# Patient Record
Sex: MALE | Race: WHITE | Employment: FULL TIME | URBAN - METROPOLITAN AREA
[De-identification: names, ages, dates, MRNs, and addresses within clinical notes are randomized per-mention and may not be internally consistent; named-entity substitution may affect disease eponyms.]

---

## 2018-05-21 ENCOUNTER — HOSPITAL ENCOUNTER (EMERGENCY)
Facility: HOSPITAL | Age: 28
Discharge: HOME | End: 2018-05-22
Attending: EMERGENCY MEDICINE
Payer: COMMERCIAL

## 2018-05-21 ENCOUNTER — APPOINTMENT (OUTPATIENT)
Dept: ULTRASOUND IMAGING | Facility: HOSPITAL | Age: 28
End: 2018-05-21
Attending: EMERGENCY MEDICINE
Payer: COMMERCIAL

## 2018-05-21 DIAGNOSIS — R10.32 LEFT GROIN PAIN: Primary | ICD-10-CM

## 2018-05-21 DIAGNOSIS — N50.812 LEFT TESTICULAR PAIN: ICD-10-CM

## 2018-05-21 PROCEDURE — 81003 URINALYSIS AUTO W/O SCOPE: CPT | Performed by: EMERGENCY MEDICINE

## 2018-05-21 PROCEDURE — 76870 US EXAM SCROTUM: CPT | Performed by: EMERGENCY MEDICINE

## 2018-05-21 PROCEDURE — 87591 N.GONORRHOEAE DNA AMP PROB: CPT | Performed by: EMERGENCY MEDICINE

## 2018-05-21 PROCEDURE — 87491 CHLMYD TRACH DNA AMP PROBE: CPT | Performed by: EMERGENCY MEDICINE

## 2018-05-21 PROCEDURE — 93975 VASCULAR STUDY: CPT | Performed by: EMERGENCY MEDICINE

## 2018-05-21 PROCEDURE — 99284 EMERGENCY DEPT VISIT MOD MDM: CPT

## 2018-05-22 VITALS
WEIGHT: 150 LBS | SYSTOLIC BLOOD PRESSURE: 124 MMHG | HEIGHT: 69 IN | RESPIRATION RATE: 16 BRPM | HEART RATE: 70 BPM | DIASTOLIC BLOOD PRESSURE: 80 MMHG | TEMPERATURE: 99 F | BODY MASS INDEX: 22.22 KG/M2 | OXYGEN SATURATION: 99 %

## 2018-05-22 NOTE — ED PROVIDER NOTES
Patient Seen in: [facility] AND [facility] Emergency Department    History   Patient presents with:  Abdomen/Flank Pain (GI/)      HPI    Patient presents to the ED complaining of cloudy urine, frequent urination, pain with urination and pain in his left testi normal. No stridor. No respiratory distress. Abdominal: Soft. Bowel sounds are normal. He exhibits no distension and no mass. There is no tenderness. There is no rebound and no guarding.    Genitourinary:   Genitourinary Comments: no urethral tenderness, 122/66 126/74 124/80   Pulse: 78 74 70   Resp: 18 16 16   Temp: 98.5 °F (36.9 °C)     SpO2: 99% 98% 99%   Weight: 68 kg     Height: 175.3 cm (5' 9\")       *I personally reviewed and interpreted all ED vitals.     Pulse Ox: 99%, Room air, Normal      Differ

## 2018-05-22 NOTE — ED NOTES
Pt reports to ED with complaints of left groin pain, cloudy urine, increased urinary frequency, and pain when voiding. Pt denies fever or other symptoms.

## 2018-07-08 ENCOUNTER — HOSPITAL ENCOUNTER (EMERGENCY)
Facility: HOSPITAL | Age: 28
Discharge: HOME | End: 2018-07-08

## 2018-07-08 ENCOUNTER — APPOINTMENT (OUTPATIENT)
Dept: ULTRASOUND IMAGING | Facility: HOSPITAL | Age: 28
End: 2018-07-08
Attending: NURSE PRACTITIONER

## 2018-07-08 ENCOUNTER — APPOINTMENT (OUTPATIENT)
Dept: CT IMAGING | Facility: HOSPITAL | Age: 28
End: 2018-07-08
Attending: NURSE PRACTITIONER

## 2018-07-08 VITALS
TEMPERATURE: 98 F | HEART RATE: 77 BPM | WEIGHT: 150 LBS | SYSTOLIC BLOOD PRESSURE: 128 MMHG | DIASTOLIC BLOOD PRESSURE: 72 MMHG | RESPIRATION RATE: 16 BRPM | BODY MASS INDEX: 22.22 KG/M2 | HEIGHT: 69 IN | OXYGEN SATURATION: 97 %

## 2018-07-08 DIAGNOSIS — N20.0 KIDNEY STONE: Primary | ICD-10-CM

## 2018-07-08 LAB
ANION GAP SERPL CALC-SCNC: 13 MMOL/L (ref 0–18)
BASOPHILS # BLD: 0 K/UL (ref 0–0.2)
BASOPHILS NFR BLD: 0 %
BILIRUB UR QL: NEGATIVE
BUN SERPL-MCNC: 13 MG/DL (ref 8–20)
BUN/CREAT SERPL: 10.2 (ref 10–20)
CALCIUM SERPL-MCNC: 9.8 MG/DL (ref 8.5–10.5)
CHLORIDE SERPL-SCNC: 103 MMOL/L (ref 95–110)
CLARITY UR: CLEAR
CO2 SERPL-SCNC: 23 MMOL/L (ref 22–32)
COLOR UR: YELLOW
CREAT SERPL-MCNC: 1.28 MG/DL (ref 0.5–1.5)
EOSINOPHIL # BLD: 0 K/UL (ref 0–0.7)
EOSINOPHIL NFR BLD: 0 %
ERYTHROCYTE [DISTWIDTH] IN BLOOD BY AUTOMATED COUNT: 13.1 % (ref 11–15)
GLUCOSE SERPL-MCNC: 116 MG/DL (ref 70–99)
GLUCOSE UR-MCNC: NEGATIVE MG/DL
HCT VFR BLD AUTO: 50.3 % (ref 41–52)
HGB BLD-MCNC: 17.2 G/DL (ref 13.5–17.5)
KETONES UR-MCNC: NEGATIVE MG/DL
LEUKOCYTE ESTERASE UR QL STRIP.AUTO: NEGATIVE
LYMPHOCYTES # BLD: 1.8 K/UL (ref 1–4)
LYMPHOCYTES NFR BLD: 12 %
MCH RBC QN AUTO: 32.1 PG (ref 27–32)
MCHC RBC AUTO-ENTMCNC: 34.2 G/DL (ref 32–37)
MCV RBC AUTO: 93.7 FL (ref 80–100)
MONOCYTES # BLD: 1.1 K/UL (ref 0–1)
MONOCYTES NFR BLD: 7 %
NEUTROPHILS # BLD AUTO: 12 K/UL (ref 1.8–7.7)
NEUTROPHILS NFR BLD: 80 %
NITRITE UR QL STRIP.AUTO: NEGATIVE
OSMOLALITY UR CALC.SUM OF ELEC: 289 MOSM/KG (ref 275–295)
PH UR: 8 [PH] (ref 5–8)
PLATELET # BLD AUTO: 233 K/UL (ref 140–400)
PMV BLD AUTO: 7.7 FL (ref 7.4–10.3)
POTASSIUM SERPL-SCNC: 3.6 MMOL/L (ref 3.3–5.1)
PROT UR-MCNC: NEGATIVE MG/DL
RBC # BLD AUTO: 5.37 M/UL (ref 4.5–5.9)
RBC #/AREA URNS AUTO: 4 /HPF
SODIUM SERPL-SCNC: 139 MMOL/L (ref 136–144)
SP GR UR STRIP: 1.01 (ref 1–1.03)
UROBILINOGEN UR STRIP-ACNC: <2
VIT C UR-MCNC: NEGATIVE MG/DL
WBC # BLD AUTO: 14.9 K/UL (ref 4–11)
WBC #/AREA URNS AUTO: <1 /HPF

## 2018-07-08 PROCEDURE — 80048 BASIC METABOLIC PNL TOTAL CA: CPT | Performed by: NURSE PRACTITIONER

## 2018-07-08 PROCEDURE — 74177 CT ABD & PELVIS W/CONTRAST: CPT | Performed by: NURSE PRACTITIONER

## 2018-07-08 PROCEDURE — 96375 TX/PRO/DX INJ NEW DRUG ADDON: CPT

## 2018-07-08 PROCEDURE — 96361 HYDRATE IV INFUSION ADD-ON: CPT

## 2018-07-08 PROCEDURE — 93975 VASCULAR STUDY: CPT | Performed by: NURSE PRACTITIONER

## 2018-07-08 PROCEDURE — 76870 US EXAM SCROTUM: CPT | Performed by: NURSE PRACTITIONER

## 2018-07-08 PROCEDURE — 99284 EMERGENCY DEPT VISIT MOD MDM: CPT

## 2018-07-08 PROCEDURE — 96374 THER/PROPH/DIAG INJ IV PUSH: CPT

## 2018-07-08 PROCEDURE — 85025 COMPLETE CBC W/AUTO DIFF WBC: CPT | Performed by: NURSE PRACTITIONER

## 2018-07-08 PROCEDURE — 81001 URINALYSIS AUTO W/SCOPE: CPT | Performed by: NURSE PRACTITIONER

## 2018-07-08 RX ORDER — TAMSULOSIN HYDROCHLORIDE 0.4 MG/1
0.4 CAPSULE ORAL DAILY
Qty: 7 CAPSULE | Refills: 0 | Status: SHIPPED | OUTPATIENT
Start: 2018-07-08 | End: 2018-07-15

## 2018-07-08 RX ORDER — HYDROCODONE BITARTRATE AND ACETAMINOPHEN 5; 325 MG/1; MG/1
1 TABLET ORAL EVERY 6 HOURS PRN
Qty: 10 TABLET | Refills: 0 | Status: SHIPPED | OUTPATIENT
Start: 2018-07-08 | End: 2018-07-15

## 2018-07-08 RX ORDER — ONDANSETRON 2 MG/ML
4 INJECTION INTRAMUSCULAR; INTRAVENOUS ONCE
Status: COMPLETED | OUTPATIENT
Start: 2018-07-08 | End: 2018-07-08

## 2018-07-08 RX ORDER — KETOROLAC TROMETHAMINE 30 MG/ML
30 INJECTION, SOLUTION INTRAMUSCULAR; INTRAVENOUS ONCE
Status: COMPLETED | OUTPATIENT
Start: 2018-07-08 | End: 2018-07-08

## 2018-07-08 NOTE — ED PROVIDER NOTES
Patient Seen in: [facility] AND [facility] Emergency Department    History   CC: testicular pain  HPI: Garry Ridley 33year old male  who presents to the ER c/o left sided testicular pain, groin pain, and lower abd pain sudden onset this am at 0900.   States he Temporal  SpO2: 100 %  O2 Device: None (Room air)    Current:/70   Pulse 71   Temp 98.1 °F (36.7 °C) (Oral)   Resp 16   Ht 175.3 cm (5' 9\")   Wt 68 kg   SpO2 97%   BMI 22.15 kg/m²         General - Appears well, non-toxic and in NAD  Head - Appears RAINBOW DRAW BLUE   RAINBOW DRAW LAVENDER   RAINBOW DRAW DARK GREEN   RAINBOW DRAW LIGHT GREEN   RAINBOW DRAW GOLD   RAINBOW DRAW LAVENDER TALL (BNP)       MDM     Final result by Lalita Montes MD (07/08/18 14:06:11)                Impression:    C aneurysm or dissection. RETROPERITONEUM: No mass or enlarged adenopathy.    BOWEL/MESENTERY: Normal.  No visible mass, obstruction, or bowel wall thickening.  Appendix normal.   ABDOMINAL WALL: Normal.  No mass or hernia.    URINARY BLADDER: No visible fo epididymal flow.       LEFT  TESTICLE: Measures 5.2 x 2.2 x 2.7 cm.  Normal echogenicity.  No masses.    EPIDIDYMIS: Normal size and echogenicity.    OTHER: Negative.  No varicocele or hydrocele.    DOPPLER: Normal arterial and venous flow in the testicle w

## 2018-07-08 NOTE — ED INITIAL ASSESSMENT (HPI)
Pt to ED c/o sudden onset of severe bilateral testicle pain with abd pain and vomiting starting at 0900

## 2018-07-08 NOTE — ED NOTES
Patient c/o pain to his left testicle. Denies any heavy lifting, rigorous exercise. States he drove home yesterday from out of town (approximately 12 hours) and c/o urinary frequency and urgency. Denies fevers/chills.

## 2020-04-27 ENCOUNTER — HOSPITAL ENCOUNTER (OUTPATIENT)
Age: 30
Discharge: HOME | End: 2020-04-27
Attending: FAMILY MEDICINE
Payer: COMMERCIAL

## 2020-04-27 VITALS
OXYGEN SATURATION: 100 % | RESPIRATION RATE: 18 BRPM | WEIGHT: 170 LBS | SYSTOLIC BLOOD PRESSURE: 147 MMHG | DIASTOLIC BLOOD PRESSURE: 63 MMHG | HEIGHT: 69 IN | TEMPERATURE: 99 F | HEART RATE: 77 BPM | BODY MASS INDEX: 25.18 KG/M2

## 2020-04-27 DIAGNOSIS — S61.012A THUMB LACERATION, LEFT, INITIAL ENCOUNTER: Primary | ICD-10-CM

## 2020-04-27 PROCEDURE — 90471 IMMUNIZATION ADMIN: CPT

## 2020-04-27 PROCEDURE — 99213 OFFICE O/P EST LOW 20 MIN: CPT

## 2020-04-27 PROCEDURE — 12001 RPR S/N/AX/GEN/TRNK 2.5CM/<: CPT

## 2020-04-27 NOTE — ED INITIAL ASSESSMENT (HPI)
Pt states he cut his L thumb with a pocket knife 1 hr ago. Unknown tetanus. Approximate 2cm lac noted to L thumb. No active bleeding noted. Positive CMS. Positive radial pulse. Awake/alert. Breathing easy and even without distress. Speech clear.  Skin

## 2020-04-27 NOTE — ED PROVIDER NOTES
Patient Seen in: [address] [facility]      History   Patient presents with:  Laceration Abrasion    Stated Complaint: left thumb laceration    HPI    Pt is a 34 yo with a left thumb laceration sustained about 1 hour ago.  Patient cu digital block was performed, 5,  4-0 sutures were placed and wound was dressed. Pt tolerated the procedure well. Min blood loss. MDM     Pt is a 36 yo with a Left thumb 2cm laceration. Wound was sutured and bandaged.  Wound care instructions were given

## 2020-09-22 ENCOUNTER — HOSPITAL ENCOUNTER (OUTPATIENT)
Age: 30
Discharge: HOME | End: 2020-09-22
Payer: COMMERCIAL

## 2020-09-22 VITALS
TEMPERATURE: 98 F | HEART RATE: 63 BPM | HEIGHT: 69 IN | RESPIRATION RATE: 20 BRPM | OXYGEN SATURATION: 99 % | BODY MASS INDEX: 25.03 KG/M2 | SYSTOLIC BLOOD PRESSURE: 129 MMHG | DIASTOLIC BLOOD PRESSURE: 69 MMHG | WEIGHT: 169 LBS

## 2020-09-22 DIAGNOSIS — Z20.822 ENCOUNTER FOR SCREENING LABORATORY TESTING FOR COVID-19 VIRUS: ICD-10-CM

## 2020-09-22 DIAGNOSIS — R09.81 NASAL CONGESTION: Primary | ICD-10-CM

## 2020-09-22 DIAGNOSIS — Z20.822 CLOSE EXPOSURE TO COVID-19 VIRUS: ICD-10-CM

## 2020-09-22 PROCEDURE — 99213 OFFICE O/P EST LOW 20 MIN: CPT

## 2020-09-22 NOTE — ED PROVIDER NOTES
Patient Seen in: [facility]      History   Patient presents with:  Testing    Stated Complaint: Covid 19 test    HPI    This is a 64-year-old male presenting for COVID-19 testing.   Patient states he started having nasal c rate.   Pulmonary:      Effort: Pulmonary effort is normal.      Breath sounds: Normal breath sounds. Abdominal:      General: Bowel sounds are normal.      Palpations: Abdomen is soft. Musculoskeletal: Normal range of motion.    Skin:     General: Skin

## 2020-09-22 NOTE — ED INITIAL ASSESSMENT (HPI)
PATIENT ARRIVED AMBULATORY TO ROOM FOR COVID TESTING. +NASAL CONGESTION. NO FEVERS. NO COUGH.  PATIENT HAD DIRECT EXPOSURE TO SOMEONE WHO IS POSITIVE

## 2020-09-24 LAB — SARS-COV-2 RNA,QUAL, RT-PCR: NOT DETECTED

## 2021-02-17 ENCOUNTER — HOSPITAL ENCOUNTER (OUTPATIENT)
Age: 31
Discharge: HOME | End: 2021-02-17
Attending: EMERGENCY MEDICINE
Payer: COMMERCIAL

## 2021-02-17 ENCOUNTER — APPOINTMENT (OUTPATIENT)
Dept: GENERAL RADIOLOGY | Age: 31
End: 2021-02-17
Attending: EMERGENCY MEDICINE
Payer: COMMERCIAL

## 2021-02-17 VITALS
SYSTOLIC BLOOD PRESSURE: 137 MMHG | RESPIRATION RATE: 18 BRPM | DIASTOLIC BLOOD PRESSURE: 75 MMHG | TEMPERATURE: 98 F | OXYGEN SATURATION: 99 % | HEART RATE: 74 BPM

## 2021-02-17 DIAGNOSIS — L03.113 CELLULITIS OF RIGHT HAND: Primary | ICD-10-CM

## 2021-02-17 PROCEDURE — 73130 X-RAY EXAM OF HAND: CPT | Performed by: EMERGENCY MEDICINE

## 2021-02-17 PROCEDURE — 99213 OFFICE O/P EST LOW 20 MIN: CPT

## 2021-02-17 RX ORDER — AMOXICILLIN 875 MG/1
875 TABLET, COATED ORAL 2 TIMES DAILY
Qty: 20 TABLET | Refills: 0 | Status: SHIPPED | OUTPATIENT
Start: 2021-02-17 | End: 2021-02-27

## 2021-02-17 RX ORDER — DOXYCYCLINE HYCLATE 100 MG/1
100 CAPSULE ORAL 2 TIMES DAILY
Qty: 20 CAPSULE | Refills: 0 | Status: SHIPPED | OUTPATIENT
Start: 2021-02-17 | End: 2021-02-27

## 2021-02-17 NOTE — ED PROVIDER NOTES
Patient Seen in: [facility] [place]      History   Patient presents with:  Rash Skin Problem    Stated Complaint: right hand laceration and swollen    HPI/Subjective:   HPI    The patient is a 78-year-old male with no significant past medical history is normal flexion and extension of the right fourth and fifth digits with minimal discomfort.   With compression on the area of swelling, unable to produce any drainage or palpate any fluctuance in the area  Skin: There is mild redness and warmth to the eduard

## 2021-02-17 NOTE — ED INITIAL ASSESSMENT (HPI)
Patient reports right hand redness and swelling upon waking up this am.  Scabbed area noted to the site. Area is warm to the touch. Patient does report punching a window a few days prior to the swelling starting. rom intact.

## 2021-02-22 ENCOUNTER — OFFICE VISIT (OUTPATIENT)
Dept: SURGERY | Age: 31
End: 2021-02-22

## 2021-02-22 VITALS
BODY MASS INDEX: 25.19 KG/M2 | WEIGHT: 170.09 LBS | DIASTOLIC BLOOD PRESSURE: 78 MMHG | HEIGHT: 69 IN | SYSTOLIC BLOOD PRESSURE: 135 MMHG | HEART RATE: 63 BPM

## 2021-02-22 DIAGNOSIS — D23.4 CYST, DERMOID, SCALP AND NECK: Primary | ICD-10-CM

## 2021-02-22 PROCEDURE — 99204 OFFICE O/P NEW MOD 45 MIN: CPT | Performed by: PLASTIC SURGERY

## 2021-02-22 RX ORDER — AMOXICILLIN 875 MG/1
875 TABLET, COATED ORAL
COMMUNITY
Start: 2021-02-17 | End: 2021-02-27

## 2021-02-22 ASSESSMENT — ENCOUNTER SYMPTOMS
DIARRHEA: 0
EYE DISCHARGE: 0
ACTIVITY CHANGE: 0
ABDOMINAL PAIN: 0
SEIZURES: 0
WEAKNESS: 0
COUGH: 0
ROS SKIN COMMENTS: PER HPI
SHORTNESS OF BREATH: 0
TROUBLE SWALLOWING: 0
APPETITE CHANGE: 0
EYE ITCHING: 0
VOMITING: 0

## 2021-03-08 ENCOUNTER — OFFICE VISIT (OUTPATIENT)
Dept: SURGERY | Age: 31
End: 2021-03-08

## 2021-03-08 DIAGNOSIS — D23.4 CYST, DERMOID, SCALP AND NECK: Primary | ICD-10-CM

## 2021-03-08 PROCEDURE — 88304 TISSUE EXAM BY PATHOLOGIST: CPT | Performed by: CLINICAL MEDICAL LABORATORY

## 2021-03-08 PROCEDURE — 21014 EXC FACE TUM DEEP 2 CM/>: CPT | Performed by: PLASTIC SURGERY

## 2021-03-11 LAB
ASR DISCLAIMER: NORMAL
CASE RPRT: NORMAL
CLINICAL INFO: NORMAL
PATH REPORT.FINAL DX SPEC: NORMAL
PATH REPORT.GROSS SPEC: NORMAL

## 2021-03-28 ENCOUNTER — HOSPITAL ENCOUNTER (EMERGENCY)
Facility: HOSPITAL | Age: 31
Discharge: HOME | End: 2021-03-28
Attending: EMERGENCY MEDICINE
Payer: COMMERCIAL

## 2021-03-28 ENCOUNTER — APPOINTMENT (OUTPATIENT)
Dept: GENERAL RADIOLOGY | Facility: HOSPITAL | Age: 31
End: 2021-03-28
Attending: EMERGENCY MEDICINE
Payer: COMMERCIAL

## 2021-03-28 VITALS
DIASTOLIC BLOOD PRESSURE: 76 MMHG | RESPIRATION RATE: 18 BRPM | WEIGHT: 175 LBS | SYSTOLIC BLOOD PRESSURE: 138 MMHG | OXYGEN SATURATION: 97 % | BODY MASS INDEX: 25.92 KG/M2 | TEMPERATURE: 98 F | HEART RATE: 72 BPM | HEIGHT: 69 IN

## 2021-03-28 DIAGNOSIS — S93.602A SPRAIN OF LEFT FOOT, INITIAL ENCOUNTER: Primary | ICD-10-CM

## 2021-03-28 PROCEDURE — 73630 X-RAY EXAM OF FOOT: CPT | Performed by: EMERGENCY MEDICINE

## 2021-03-28 PROCEDURE — 99284 EMERGENCY DEPT VISIT MOD MDM: CPT

## 2021-03-29 NOTE — ED NOTES
Pt awake and alert, ace wrap and post op shoe in place to left foot. Discharge paperwork and follow-up discussed with pt, pt verbally understands them. Pt discharged ambulatory with steady gait - no distress noted.

## 2021-03-29 NOTE — ED PROVIDER NOTES
Patient Seen in: [facility] Emergency Department      History   Patient presents with:  Leg or Foot Injury    Stated Complaint: Foot Injury    HPI/Subjective:   HPI    26-year-old male without significant past medical history presents with complai some surrounding ecchymosis and mild swelling noted. No calcaneal tenderness noted. No ankle tenderness noted. Bilateral dorsalis pedis pulses intact and symmetric. Capillary refill less than 2 seconds to the toes.   Neurologic: Motor and sensation is i

## 2021-11-05 ENCOUNTER — HOSPITAL ENCOUNTER (OUTPATIENT)
Age: 31
Discharge: HOME | End: 2021-11-05
Attending: EMERGENCY MEDICINE
Payer: COMMERCIAL

## 2021-11-05 VITALS
RESPIRATION RATE: 20 BRPM | OXYGEN SATURATION: 99 % | HEART RATE: 58 BPM | DIASTOLIC BLOOD PRESSURE: 77 MMHG | TEMPERATURE: 98 F | SYSTOLIC BLOOD PRESSURE: 142 MMHG

## 2021-11-05 DIAGNOSIS — J39.8 CONGESTION OF UPPER RESPIRATORY TRACT: ICD-10-CM

## 2021-11-05 DIAGNOSIS — Z20.822 ENCOUNTER FOR LABORATORY TESTING FOR COVID-19 VIRUS: Primary | ICD-10-CM

## 2021-11-05 PROCEDURE — 99212 OFFICE O/P EST SF 10 MIN: CPT

## 2021-11-05 PROCEDURE — 99213 OFFICE O/P EST LOW 20 MIN: CPT

## 2021-11-05 NOTE — ED PROVIDER NOTES
Patient Seen in: [facility] [place]      History   Patient presents with:  Testing    Stated Complaint: COVID test, cold like symptoms    Subjective:   HPI    28year old male with h/o otherwise healthy who presents with nasal congestion, cold sx for abscesses. Eyes:      Conjunctiva/sclera: Conjunctivae normal.      Pupils: Pupils are equal, round, and reactive to light. Neck:      Trachea: Trachea normal.   Cardiovascular:      Rate and Rhythm: Normal rate and regular rhythm.       Heart sounds: provider          Medications Prescribed:  There are no discharge medications for this patient.

## 2021-11-05 NOTE — ED INITIAL ASSESSMENT (HPI)
PATIENT ARRIVED AMBULATORY TO ROOM C/O SYMPTOMS THAT STARTED THIS MORNING. +NASAL CONGESTION. NO COUGH. NO FEVERS NO N/V/D. EASY NON LABORED RESPIRATIONS.  FULLY VACCINATED

## 2022-01-04 ENCOUNTER — HOSPITAL ENCOUNTER (OUTPATIENT)
Age: 32
Discharge: HOME | End: 2022-01-04
Payer: COMMERCIAL

## 2022-01-07 LAB — SARS-COV-2 RNA RESP QL NAA+PROBE: DETECTED

## 2024-03-14 ENCOUNTER — APPOINTMENT (OUTPATIENT)
Dept: GENERAL RADIOLOGY | Age: 34
End: 2024-03-14
Attending: PHYSICIAN ASSISTANT
Payer: COMMERCIAL

## 2024-03-14 ENCOUNTER — HOSPITAL ENCOUNTER (OUTPATIENT)
Age: 34
Discharge: HOME | End: 2024-03-14
Payer: COMMERCIAL

## 2024-03-14 VITALS
DIASTOLIC BLOOD PRESSURE: 75 MMHG | OXYGEN SATURATION: 99 % | RESPIRATION RATE: 16 BRPM | TEMPERATURE: 98 F | HEART RATE: 74 BPM | SYSTOLIC BLOOD PRESSURE: 132 MMHG

## 2024-03-14 DIAGNOSIS — H66.93 BILATERAL ACUTE OTITIS MEDIA: Primary | ICD-10-CM

## 2024-03-14 DIAGNOSIS — R05.1 ACUTE COUGH: ICD-10-CM

## 2024-03-14 DIAGNOSIS — J01.90 ACUTE NON-RECURRENT SINUSITIS, UNSPECIFIED LOCATION: ICD-10-CM

## 2024-03-14 PROCEDURE — 99213 OFFICE O/P EST LOW 20 MIN: CPT

## 2024-03-14 PROCEDURE — 71046 X-RAY EXAM CHEST 2 VIEWS: CPT | Performed by: PHYSICIAN ASSISTANT

## 2024-03-14 PROCEDURE — 99214 OFFICE O/P EST MOD 30 MIN: CPT

## 2024-03-14 RX ORDER — AMOXICILLIN AND CLAVULANATE POTASSIUM 875; 125 MG/1; MG/1
1 TABLET, FILM COATED ORAL 2 TIMES DAILY
Qty: 14 TABLET | Refills: 0 | Status: SHIPPED | OUTPATIENT
Start: 2024-03-14 | End: 2024-03-21

## 2024-03-14 RX ORDER — BENZONATATE 100 MG/1
100 CAPSULE ORAL 3 TIMES DAILY PRN
Qty: 30 CAPSULE | Refills: 0 | Status: SHIPPED | OUTPATIENT
Start: 2024-03-14 | End: 2024-04-13

## 2024-03-14 RX ORDER — IBUPROFEN 600 MG/1
TABLET ORAL
Qty: 20 TABLET | Refills: 0 | Status: SHIPPED | OUTPATIENT
Start: 2024-03-14

## 2024-03-14 RX ORDER — ALBUTEROL SULFATE 90 UG/1
2 AEROSOL, METERED RESPIRATORY (INHALATION) EVERY 4 HOURS PRN
Qty: 1 EACH | Refills: 0 | Status: SHIPPED | OUTPATIENT
Start: 2024-03-14 | End: 2024-04-13

## 2024-03-14 NOTE — ED PROVIDER NOTES
Patient Seen in: [facility] [place]    History     Chief Complaint   Patient presents with    Cough/URI     Stated Complaint: headahces and body aches, cold    HPI    Jamarcus Mitchell is a 33 year old male presents with chief complaint of cough.  Onset 2 weeks ago.  Patient reports associated nasal congestion, sinus pain, generalized headache, bilateral earache, chills and tactile fever.  Patient denies worst headache of life.  Patient denies acute onset of headache.  Patient denies otorrhea, sore throat, abdominal pain, nausea, vomiting, diarrhea, constipation, dysuria, hematuria, flank pain, rash, neck pain, neck swelling, restricted neck movement, dyspnea, wheeze, hemoptysis, weakness, paresthesias, vision changes, altered mental status, loss of consciousness, amnesia.      History reviewed. No pertinent past medical history.    History reviewed. No pertinent surgical history.         No family history on file.    Social History     Socioeconomic History    Marital status: Single   Tobacco Use    Smoking status: Never    Smokeless tobacco: Never   Vaping Use    Vaping Use: Never used   Substance and Sexual Activity    Alcohol use: Yes     Comment: occasionally    Drug use: Never       Review of Systems    Positive for stated complaint: headahces and body aches, cold  Other systems are as noted in HPI.  Constitutional and vital signs reviewed.      All other systems reviewed and negative except as noted above.    PSFH elements reviewed from today and agreed except as otherwise stated in HPI.    Physical Exam     ED Triage Vitals [03/14/24 1245]   /75   Pulse 74   Resp 16   Temp 97.7 °F (36.5 °C)   Temp src Temporal   SpO2 99 %   O2 Device None (Room air)       Current:/75   Pulse 74   Temp 97.7 °F (36.5 °C) (Temporal)   Resp 16   SpO2 99%     PULSE OX within normal limits on room air as interpreted by this provider.     Constitutional: The patient is cooperative. Appears well-developed and  well-nourished.  Mild discomfort.  Psychological: Alert, No abnormalities of mood, affect.  Head: Normocephalic/atraumatic.    Eyes: Pupils are equal round reactive to light.  Conjunctiva are within normal limits.  ENT: Pharynx noninjected.  Tonsils within normal size limits bilaterally.  No tonsillar exudates.  Bilateral TMs injected, bulging.  Purulent fluid present proximally to intact bilateral TMs.  Auditory canals within normal limits bilaterally.  No post auricular tenderness, adenopathy or erythema.  No otorrhea mucous membranes moist.  Positive nasal congestion.  Neck: The neck is supple.  No meningeal signs.  Chest: There is no tenderness to the chest wall.  No CVA tenderness bilaterally.  Respiratory: Respiratory effort was normal.  Positive rhonchi left lung.  There is no stridor.  Air entry is equal.  Cardiovascular: Regular rate and rhythm.  Capillary refill is brisk.    Genitourinary: Not examined.  Lymphatic: No gross lymphadenopathy noted.  Musculoskeletal: Musculoskeletal system is grossly intact.  There is no obvious deformity.  Neurological: No facial asymmetry.  Normal gait.  Normal sensory exam.  Patient exhibits normal speech.  Strength and range of motion symmetrical of all extremities x4.  Skin: Skin is normal to inspection.  Warm and dry.  No obvious bruising.  No obvious rash.        ED Course   Labs Reviewed - No data to display    MDM     Radiology findings: XR CHEST PA + LAT CHEST (CPT=71046)    Result Date: 3/14/2024  CONCLUSION:  1. No acute cardiopulmonary disease    Dictated by (CST): Diaz Mix MD on 3/14/2024 at 1:23 PM     Finalized by (CST): Diaz Mix MD on 3/14/2024 at 1:25 PM           Chest x-ray images independently reviewed by this provider-no pneumonia.    Physical exam remained stable over serial reexaminations as previously documented.  Results reviewed with patient.    I have given the patient instructions regarding their diagnoses, expectations,  follow up, and ER precautions. I explained to the patient that emergent conditions may arise and to go to the ER for new, worsening or any persistent conditions. I've explained the importance of following up with their doctor as instructed. The patient verbalized understanding of the discharge instructions and plan.      Disposition and Plan     Clinical Impression:  1. Bilateral acute otitis media    2. Acute non-recurrent sinusitis, unspecified location    3. Acute cough        Disposition:  Discharge    Follow-up:  Hilary Chavez MD  [address] [place] [address] [phone]    Call in 1 day  For follow-up      Medications Prescribed:  Current Discharge Medication List        START taking these medications    Details   amoxicillin clavulanate 875-125 MG Oral Tab Take 1 tablet by mouth 2 (two) times daily for 7 days.  Qty: 14 tablet, Refills: 0      Spacer/Aero-Holding Chambers Does not apply Device Use with albuterol inhaler  Qty: 1 each, Refills: 0      albuterol 108 (90 Base) MCG/ACT Inhalation Aero Soln Inhale 2 puffs into the lungs every 4 (four) hours as needed for Wheezing.  Qty: 1 each, Refills: 0      benzonatate 100 MG Oral Cap Take 1 capsule (100 mg total) by mouth 3 (three) times daily as needed for cough.  Qty: 30 capsule, Refills: 0      ibuprofen 600 MG Oral Tab Take 1 tablet (600 mg total) by mouth every 6 hours with food  Qty: 20 tablet, Refills: 0

## 2024-07-24 ENCOUNTER — APPOINTMENT (OUTPATIENT)
Dept: GENERAL RADIOLOGY | Age: 34
End: 2024-07-24
Attending: STUDENT IN AN ORGANIZED HEALTH CARE EDUCATION/TRAINING PROGRAM

## 2024-07-24 ENCOUNTER — HOSPITAL ENCOUNTER (EMERGENCY)
Age: 34
Discharge: HOME | End: 2024-07-24
Attending: STUDENT IN AN ORGANIZED HEALTH CARE EDUCATION/TRAINING PROGRAM

## 2024-07-24 VITALS
DIASTOLIC BLOOD PRESSURE: 78 MMHG | TEMPERATURE: 98.3 F | BODY MASS INDEX: 23.77 KG/M2 | OXYGEN SATURATION: 95 % | HEART RATE: 98 BPM | WEIGHT: 160.94 LBS | SYSTOLIC BLOOD PRESSURE: 124 MMHG | RESPIRATION RATE: 14 BRPM

## 2024-07-24 DIAGNOSIS — S81.811A LACERATION OF RIGHT LOWER LEG, INITIAL ENCOUNTER: Primary | ICD-10-CM

## 2024-07-24 DIAGNOSIS — S87.81XA CRUSHING INJURY OF RIGHT LOWER LEG, INITIAL ENCOUNTER: ICD-10-CM

## 2024-07-24 DIAGNOSIS — S87.82XA CRUSH INJURY, LEG, LOWER, LEFT, INITIAL ENCOUNTER: ICD-10-CM

## 2024-07-24 DIAGNOSIS — D72.9 ABNORMAL WHITE BLOOD CELL (WBC): ICD-10-CM

## 2024-07-24 LAB
ABO + RH BLD: NORMAL
ALBUMIN SERPL-MCNC: 4 G/DL (ref 3.6–5.1)
ALBUMIN/GLOB SERPL: 1.1 {RATIO} (ref 1–2.4)
ALP SERPL-CCNC: 81 UNITS/L (ref 45–117)
ALT SERPL-CCNC: 34 UNITS/L
ANION GAP SERPL CALC-SCNC: 11 MMOL/L (ref 7–19)
AST SERPL-CCNC: 30 UNITS/L
BASOPHILS # BLD: 0.1 K/MCL (ref 0–0.3)
BASOPHILS NFR BLD: 1 %
BILIRUB SERPL-MCNC: 0.7 MG/DL (ref 0.2–1)
BLD GP AB SCN SERPL QL GEL: NEGATIVE
BUN SERPL-MCNC: 12 MG/DL (ref 6–20)
BUN/CREAT SERPL: 12 (ref 7–25)
CALCIUM SERPL-MCNC: 9.5 MG/DL (ref 8.4–10.2)
CHLORIDE SERPL-SCNC: 106 MMOL/L (ref 97–110)
CO2 SERPL-SCNC: 24 MMOL/L (ref 21–32)
CREAT SERPL-MCNC: 0.99 MG/DL (ref 0.67–1.17)
DEPRECATED RDW RBC: 40.6 FL (ref 39–50)
EGFRCR SERPLBLD CKD-EPI 2021: >90 ML/MIN/{1.73_M2}
EOSINOPHIL # BLD: 0.5 K/MCL (ref 0–0.5)
EOSINOPHIL NFR BLD: 4 %
ERYTHROCYTE [DISTWIDTH] IN BLOOD: 12.2 % (ref 11–15)
FASTING DURATION TIME PATIENT: ABNORMAL H
GLOBULIN SER-MCNC: 3.5 G/DL (ref 2–4)
GLUCOSE SERPL-MCNC: 145 MG/DL (ref 70–99)
HCT VFR BLD CALC: 44 % (ref 39–51)
HGB BLD-MCNC: 15.1 G/DL (ref 13–17)
LYMPHOCYTES # BLD: 6.6 K/MCL (ref 1–4.8)
LYMPHOCYTES NFR BLD: 39 %
MCH RBC QN AUTO: 31.4 PG (ref 26–34)
MCHC RBC AUTO-ENTMCNC: 34.3 G/DL (ref 32–36.5)
MCV RBC AUTO: 91.5 FL (ref 78–100)
MONOCYTES # BLD: 0.4 K/MCL (ref 0.3–0.9)
MONOCYTES NFR BLD: 3 %
NEUTROPHILS # BLD: 4.9 K/MCL (ref 1.8–7.7)
NEUTS SEG NFR BLD: 39 %
NRBC BLD MANUAL-RTO: 0 /100 WBC
PLAT MORPH BLD: NORMAL
PLATELET # BLD AUTO: 313 K/MCL (ref 140–450)
POTASSIUM SERPL-SCNC: 3.2 MMOL/L (ref 3.4–5.1)
PROT SERPL-MCNC: 7.5 G/DL (ref 6.4–8.2)
RBC # BLD: 4.81 MIL/MCL (ref 4.5–5.9)
RBC MORPH BLD: NORMAL
SODIUM SERPL-SCNC: 138 MMOL/L (ref 135–145)
TYPE AND SCREEN EXPIRATION DATE: NORMAL
VARIANT LYMPHS NFR BLD: 14 % (ref 0–5)
WBC # BLD: 12.5 K/MCL (ref 4.2–11)
WBC MORPH BLD: ABNORMAL

## 2024-07-24 PROCEDURE — 10002800 HB RX 250 W HCPCS

## 2024-07-24 PROCEDURE — 73610 X-RAY EXAM OF ANKLE: CPT

## 2024-07-24 PROCEDURE — 73630 X-RAY EXAM OF FOOT: CPT

## 2024-07-24 PROCEDURE — 99284 EMERGENCY DEPT VISIT MOD MDM: CPT

## 2024-07-24 PROCEDURE — 73562 X-RAY EXAM OF KNEE 3: CPT

## 2024-07-24 PROCEDURE — 86901 BLOOD TYPING SEROLOGIC RH(D): CPT | Performed by: STUDENT IN AN ORGANIZED HEALTH CARE EDUCATION/TRAINING PROGRAM

## 2024-07-24 PROCEDURE — 10002800 HB RX 250 W HCPCS: Performed by: STUDENT IN AN ORGANIZED HEALTH CARE EDUCATION/TRAINING PROGRAM

## 2024-07-24 PROCEDURE — 73590 X-RAY EXAM OF LOWER LEG: CPT

## 2024-07-24 PROCEDURE — 96376 TX/PRO/DX INJ SAME DRUG ADON: CPT

## 2024-07-24 PROCEDURE — 12032 INTMD RPR S/A/T/EXT 2.6-7.5: CPT

## 2024-07-24 PROCEDURE — 96375 TX/PRO/DX INJ NEW DRUG ADDON: CPT

## 2024-07-24 PROCEDURE — 80053 COMPREHEN METABOLIC PANEL: CPT | Performed by: STUDENT IN AN ORGANIZED HEALTH CARE EDUCATION/TRAINING PROGRAM

## 2024-07-24 PROCEDURE — 10002803 HB RX 637: Performed by: STUDENT IN AN ORGANIZED HEALTH CARE EDUCATION/TRAINING PROGRAM

## 2024-07-24 PROCEDURE — 10002801 HB RX 250 W/O HCPCS: Performed by: STUDENT IN AN ORGANIZED HEALTH CARE EDUCATION/TRAINING PROGRAM

## 2024-07-24 PROCEDURE — 96374 THER/PROPH/DIAG INJ IV PUSH: CPT

## 2024-07-24 PROCEDURE — 85027 COMPLETE CBC AUTOMATED: CPT | Performed by: STUDENT IN AN ORGANIZED HEALTH CARE EDUCATION/TRAINING PROGRAM

## 2024-07-24 RX ORDER — HYDROCODONE BITARTRATE AND ACETAMINOPHEN 5; 325 MG/1; MG/1
1 TABLET ORAL EVERY 6 HOURS PRN
Qty: 8 TABLET | Refills: 0 | Status: SHIPPED | OUTPATIENT
Start: 2024-07-24

## 2024-07-24 RX ORDER — ONDANSETRON 2 MG/ML
INJECTION INTRAMUSCULAR; INTRAVENOUS
Status: COMPLETED
Start: 2024-07-24 | End: 2024-07-24

## 2024-07-24 RX ORDER — CEFADROXIL 500 MG/1
500 CAPSULE ORAL 2 TIMES DAILY
Qty: 14 CAPSULE | Refills: 0 | Status: SHIPPED | OUTPATIENT
Start: 2024-07-24 | End: 2024-07-31

## 2024-07-24 RX ORDER — DOXYCYCLINE HYCLATE 100 MG
100 TABLET ORAL 2 TIMES DAILY
Qty: 14 TABLET | Refills: 0 | Status: SHIPPED | OUTPATIENT
Start: 2024-07-24 | End: 2024-07-31

## 2024-07-24 RX ORDER — ONDANSETRON 2 MG/ML
4 INJECTION INTRAMUSCULAR; INTRAVENOUS ONCE
Status: COMPLETED | OUTPATIENT
Start: 2024-07-24 | End: 2024-07-24

## 2024-07-24 RX ORDER — OFLOXACIN 3 MG/ML
1 SOLUTION/ DROPS OPHTHALMIC ONCE
Status: DISCONTINUED | OUTPATIENT
Start: 2024-07-24 | End: 2024-07-24

## 2024-07-24 RX ORDER — LIDOCAINE HYDROCHLORIDE AND EPINEPHRINE 10; 10 MG/ML; UG/ML
1 INJECTION, SOLUTION INFILTRATION; PERINEURAL ONCE
Status: COMPLETED | OUTPATIENT
Start: 2024-07-24 | End: 2024-07-24

## 2024-07-24 RX ORDER — HYDROCODONE BITARTRATE AND ACETAMINOPHEN 5; 325 MG/1; MG/1
1 TABLET ORAL ONCE
Status: COMPLETED | OUTPATIENT
Start: 2024-07-24 | End: 2024-07-24

## 2024-07-24 RX ADMIN — ONDANSETRON 4 MG: 2 INJECTION INTRAMUSCULAR; INTRAVENOUS at 21:17

## 2024-07-24 RX ADMIN — HYDROCODONE BITARTRATE AND ACETAMINOPHEN 1 TABLET: 5; 325 TABLET ORAL at 22:58

## 2024-07-24 RX ADMIN — LIDOCAINE HYDROCHLORIDE,EPINEPHRINE BITARTRATE 1 ML: 10; .01 INJECTION, SOLUTION INFILTRATION; PERINEURAL at 22:07

## 2024-07-24 RX ADMIN — MORPHINE SULFATE 4 MG: 4 INJECTION INTRAVENOUS at 22:04

## 2024-07-24 RX ADMIN — MORPHINE SULFATE 4 MG: 2 INJECTION, SOLUTION INTRAMUSCULAR; INTRAVENOUS at 21:16

## 2024-07-24 RX ADMIN — CEFAZOLIN SODIUM 1000 MG: 300 INJECTION, POWDER, LYOPHILIZED, FOR SOLUTION INTRAVENOUS at 21:19

## 2024-07-24 ASSESSMENT — ENCOUNTER SYMPTOMS
CHILLS: 0
SHORTNESS OF BREATH: 0
FEVER: 0
WOUND: 1
COUGH: 0

## 2024-07-25 LAB
RAINBOW EXTRA TUBES HOLD SPECIMEN: NORMAL

## 2024-09-10 ENCOUNTER — HOSPITAL ENCOUNTER (OUTPATIENT)
Age: 34
Discharge: HOME | End: 2024-09-10
Payer: COMMERCIAL

## 2024-09-10 VITALS
TEMPERATURE: 98 F | OXYGEN SATURATION: 100 % | RESPIRATION RATE: 16 BRPM | HEART RATE: 67 BPM | SYSTOLIC BLOOD PRESSURE: 126 MMHG | DIASTOLIC BLOOD PRESSURE: 80 MMHG

## 2024-09-10 DIAGNOSIS — J01.00 ACUTE NON-RECURRENT MAXILLARY SINUSITIS: Primary | ICD-10-CM

## 2024-09-10 LAB
S PYO AG THROAT QL IA.RAPID: NEGATIVE
SARS-COV-2 RNA RESP QL NAA+PROBE: NOT DETECTED

## 2024-09-10 PROCEDURE — 99213 OFFICE O/P EST LOW 20 MIN: CPT

## 2024-09-10 PROCEDURE — 87651 STREP A DNA AMP PROBE: CPT | Performed by: PHYSICIAN ASSISTANT

## 2024-09-10 RX ORDER — BENZOCAINE AND MENTHOL, UNSPECIFIED FORM 15; 2.3 MG/1; MG/1
1 LOZENGE ORAL 3 TIMES DAILY PRN
Qty: 16 LOZENGE | Refills: 0 | Status: SHIPPED | OUTPATIENT
Start: 2024-09-10

## 2024-09-10 RX ORDER — AZITHROMYCIN 250 MG/1
TABLET, FILM COATED ORAL
Qty: 6 TABLET | Refills: 0 | Status: SHIPPED | OUTPATIENT
Start: 2024-09-10 | End: 2024-09-15

## 2024-09-10 RX ORDER — FLUTICASONE PROPIONATE 50 MCG
1 SPRAY, SUSPENSION (ML) NASAL 2 TIMES DAILY
Qty: 16 G | Refills: 0 | Status: SHIPPED | OUTPATIENT
Start: 2024-09-10 | End: 2024-09-20

## 2024-09-10 NOTE — ED PROVIDER NOTES
Chief Complaint   Patient presents with    Cough/URI       HPI:     Jamarcus Mitchell is a 34 year old male who presents for evaluation of cough congestion sore throat over the last 3 days, denies associated fever with periodic Advil use last yesterday, afebrile on arrival.  Using Mucinex as well with mild improvement.  Notes mild frontal headache and sinus pressure with previous history of sinusitis with antibiotics earlier this year.  Denies sick contacts or exposures including coronavirus or strep.  Denies associated dizziness earache dysphagia neck pain chest pain shortness of breath abdominal pain vomiting diarrhea dysuria or rash.  Tolerating p.o. okay.      PFSH    PFSH asessment screens reviewed and agree.  Nurses notes reviewed I agree with documentation.    No family history on file.  Family history reviewed with patient/caregiver and is not pertinent to presenting problem.  Social History     Socioeconomic History    Marital status: Single     Spouse name: Not on file    Number of children: Not on file    Years of education: Not on file    Highest education level: Not on file   Occupational History    Not on file   Tobacco Use    Smoking status: Never    Smokeless tobacco: Never   Vaping Use    Vaping status: Never Used   Substance and Sexual Activity    Alcohol use: Yes     Comment: occasionally    Drug use: Never    Sexual activity: Not on file   Other Topics Concern    Not on file   Social History Narrative    Not on file     Social Determinants of Health     Financial Resource Strain: Not on file   Food Insecurity: Not on file   Transportation Needs: Not on file   Physical Activity: Not on file   Stress: Not on file   Social Connections: Not on file   Housing Stability: Not on file         ROS:   Positive for stated complaint: Sore throat congestion.  All other systems reviewed and negative except as noted above.  Constitutional and Vital Signs Reviewed.      Physical Exam:     Findings:    /80    Pulse 67   Temp 97.7 °F (36.5 °C) (Temporal)   Resp 16   SpO2 100%   GENERAL: well developed, well nourished, well hydrated, no distress  SKIN: good skin turgor, no obvious rashes  NECK: No nuchal rigidity.  Supple, no adenopathy  EXTREMITIES: no cyanosis or edema. MULLER without difficulty  GI: soft, non-tender, normal bowel sounds  HEAD: normocephalic, atraumatic  EYES: sclera non icteric bilateral, conjunctiva clear  EARS: TMs clear bilaterally. Canals clear.  NOSE: Mild rhinorrhea, mild maxillary and frontal sinus tenderness.  Nasal turbinates: pink, normal mucosa  THROAT: Mild erythema posterior pharynx tonsils 104 bilaterally.  No visualized PTA.  Without exudates, uvula midline, and airway patent  LUNGS: No retractions.  Clear to auscultation bilaterally; no rales, rhonchi, or wheezes  NEURO: No focal deficits  PSYCH: Alert and oriented x3.  Answering questions appropriately.  Mood appropriate.    MDM/Assessment/Plan:   Orders for this encounter:    Orders Placed This Encounter    Rapid SARS-CoV-2 by PCR     Order Specific Question:   Release to patient     Answer:   Immediate    Rapid Strep A - ID NOW     Order Specific Question:   Release to patient     Answer:   Immediate    fluticasone propionate 50 MCG/ACT Nasal Suspension     Si spray by Nasal route in the morning and 1 spray before bedtime. Do all this for 10 days.     Dispense:  16 g     Refill:  0    azithromycin (ZITHROMAX Z-VANESSA) 250 MG Oral Tab     Si mg once followed by 250 mg daily x 4 days     Dispense:  6 tablet     Refill:  0    Benzocaine-Menthol (CEPACOL) 15-2.3 MG Mouth/Throat Lozenge     Sig: Use as directed 1 lozenge in the mouth or throat 3 (three) times daily as needed.     Dispense:  16 lozenge     Refill:  0       Labs performed this visit:  Recent Results (from the past 10 hour(s))   Rapid SARS-CoV-2 by PCR    Collection Time: 09/10/24  4:19 PM    Specimen: Nares; Other   Result Value Ref Range    Rapid SARS-CoV-2 by PCR  Not Detected Not Detected   Rapid Strep A - ID NOW    Collection Time: 09/10/24  4:19 PM    Specimen: Throat; Other   Result Value Ref Range    Strep A by PCR Negative Negative       MDM:  Coronavirus instruction negative, patient instructed likely alternative viral etiology recommendations to support sore throat and nasal congestion with CEPACOL and Flonase.  Patient requesting empiric coverage for antibiotics understanding limitations potential side effects, will provide macrolide and instructed on indications to readdress outpatient.  Alert nontoxic.    Diagnosis:    ICD-10-CM    1. Acute non-recurrent maxillary sinusitis  J01.00           All results reviewed and discussed with patient.  See AVS for detailed discharge instructions for your condition today.    Follow Up with:  Hilary Chavez MD  [address] [facility]  [address] [place] [address] [phone]    Schedule an appointment as soon as possible for a visit in 3 days  As needed, If symptoms worsen

## 2024-11-22 ENCOUNTER — HOSPITAL ENCOUNTER (OUTPATIENT)
Age: 34
Discharge: HOME | End: 2024-11-22
Payer: COMMERCIAL

## 2024-11-22 VITALS
OXYGEN SATURATION: 100 % | TEMPERATURE: 98 F | RESPIRATION RATE: 18 BRPM | SYSTOLIC BLOOD PRESSURE: 129 MMHG | HEART RATE: 70 BPM | DIASTOLIC BLOOD PRESSURE: 81 MMHG

## 2024-11-22 DIAGNOSIS — J01.40 ACUTE NON-RECURRENT PANSINUSITIS: ICD-10-CM

## 2024-11-22 DIAGNOSIS — H66.001 NON-RECURRENT ACUTE SUPPURATIVE OTITIS MEDIA OF RIGHT EAR WITHOUT SPONTANEOUS RUPTURE OF TYMPANIC MEMBRANE: Primary | ICD-10-CM

## 2024-11-22 LAB — SARS-COV-2 RNA RESP QL NAA+PROBE: NOT DETECTED

## 2024-11-22 PROCEDURE — 99213 OFFICE O/P EST LOW 20 MIN: CPT

## 2024-11-22 RX ORDER — AZELASTINE 1 MG/ML
SPRAY, METERED NASAL
Qty: 30 ML | Refills: 0 | Status: SHIPPED | OUTPATIENT
Start: 2024-11-22

## 2024-11-22 RX ORDER — PREDNISONE 20 MG/1
40 TABLET ORAL DAILY
Qty: 6 TABLET | Refills: 0 | Status: SHIPPED | OUTPATIENT
Start: 2024-11-22 | End: 2024-11-25

## 2024-11-22 NOTE — ED PROVIDER NOTES
Patient Seen in: [facility] [place]      History     Chief Complaint   Patient presents with    Nasal Congestion     Stated Complaint: Sinus    Subjective:   HPI  Jamarcus Mitchell is a 34 year old male here for sinus symptoms that started 7 days ago getting worse.  Multiple OTC measures with minimal relief.        Objective:     No pertinent past medical history.            No pertinent past surgical history.              No pertinent social history.            Review of Systems    Positive for stated complaint: Sinus  Other systems are as noted in HPI.  Constitutional and vital signs reviewed.      All other systems reviewed and negative except as noted above.    Physical Exam     ED Triage Vitals [11/22/24 0951]   /81   Pulse 70   Resp 18   Temp 98 °F (36.7 °C)   Temp src Oral   SpO2 100 %   O2 Device None (Room air)       Current Vitals:   Vital Signs  BP: 129/81  Pulse: 70  Resp: 18  Temp: 98 °F (36.7 °C)  Temp src: Oral    Oxygen Therapy  SpO2: 100 %  O2 Device: None (Room air)        Physical Exam  Vitals and nursing note reviewed.   Constitutional:       Appearance: Normal appearance. He is not ill-appearing.   HENT:      Head: Normocephalic.      Right Ear: External ear normal.      Left Ear: External ear normal.      Ears:      Comments: Right TM erythematous, and bulging with purulent effusion.  TM appears intact.      Nose: Congestion and rhinorrhea (Yellow drainage noted bilateral naris.) present.      Right Nostril: No septal hematoma.      Left Nostril: No septal hematoma.      Right Turbinates: Swollen.      Left Turbinates: Swollen.      Right Sinus: Maxillary sinus tenderness and frontal sinus tenderness present.      Left Sinus: Maxillary sinus tenderness and frontal sinus tenderness present.      Mouth/Throat:      Mouth: Mucous membranes are moist.      Pharynx: No oropharyngeal exudate.   Eyes:      Extraocular Movements: Extraocular movements intact.      Conjunctiva/sclera:  Conjunctivae normal.      Pupils: Pupils are equal, round, and reactive to light.      Comments: Mild swelling noted to lower periorbital region above maxillary sinuses.   Cardiovascular:      Rate and Rhythm: Normal rate.      Pulses: Normal pulses.   Pulmonary:      Effort: Pulmonary effort is normal.   Musculoskeletal:      Cervical back: Normal range of motion. No erythema or rigidity. No pain with movement, spinous process tenderness or muscular tenderness. Normal range of motion.   Lymphadenopathy:      Cervical: No cervical adenopathy.      Right cervical: No superficial, deep or posterior cervical adenopathy.     Left cervical: No superficial, deep or posterior cervical adenopathy.   Skin:     General: Skin is warm.      Capillary Refill: Capillary refill takes less than 2 seconds.      Findings: No lesion or rash.      Comments: Feels warm, but afebrile at this time.   Neurological:      General: No focal deficit present.      Mental Status: He is alert and oriented to person, place, and time.   Psychiatric:         Mood and Affect: Mood normal.         Behavior: Behavior normal.         Thought Content: Thought content normal.         Judgment: Judgment normal.             ED Course     Labs Reviewed   RAPID SARS-COV-2 BY PCR - Normal                   MDM             Medical Decision Making  Ddx: URI vs LRI, allergies, reactive, COVID, FLU, RSV, or somatic causes of symptoms    Treat for right otitis media, and sinusitis.  Discussed with patient that this may be too early for bacterial sinus infection, but antibiotics will treat the bacterial ear infection.  Continue doing home care.   Supportive care include but not limited to otc cold medications if there is no contraindication, cool mist humidifier, and oral hydration.  Avoid dairy if possible; This increases mucus production.      No stridor, No hot muffled speech, and no signs of compromise. Tolerating PO. Neuro wnl.   Reinforced ER precautions, and  f/u care as needed.  All questions answered, and reassurance given. No acute distress and cleared for home.      Problems Addressed:  Acute non-recurrent pansinusitis: acute illness or injury  Non-recurrent acute suppurative otitis media of right ear without spontaneous rupture of tympanic membrane: acute illness or injury    Amount and/or Complexity of Data Reviewed  External Data Reviewed: notes.     Details: Pharmacy prescription medications reviewed.  Labs: ordered. Decision-making details documented in ED Course.     Details: Independant interpretation, and reviewed with patient       Risk  OTC drugs.  Prescription drug management.        Disposition and Plan     Clinical Impression:  1. Non-recurrent acute suppurative otitis media of right ear without spontaneous rupture of tympanic membrane    2. Acute non-recurrent pansinusitis         Disposition:  Discharge  11/22/2024 10:16 am    Follow-up:  Hilary Chavez MD  [address] [facility] [address]  [place] [address]  [phone]          Ryan Sin MD  [address] [facility] [address] [place] [id number] [phone]                Medications Prescribed:  Discharge Medication List as of 11/22/2024 10:22 AM        START taking these medications    Details   amoxicillin clavulanate 875-125 MG Oral Tab Take 1 tablet by mouth 2 (two) times daily for 10 days., Normal, Disp-20 tablet, R-0      azelastine 0.1 % Nasal Solution 1 spray each nostril every 12 hours for the next 5 to 7 days and then use as needed thereafter., Normal, Disp-30 mL, R-0      predniSONE 20 MG Oral Tab Take 2 tablets (40 mg total) by mouth daily for 3 days., Normal, Disp-6 tablet, R-0                 Supplementary Documentation:

## 2024-11-22 NOTE — ED INITIAL ASSESSMENT (HPI)
Pt states he started feeling sick on Sunday, complaining of headache, sore throat, congestion, sneezing. States he thinks its a sinus infection. No fever. No sick contacts.

## 2024-11-22 NOTE — DISCHARGE INSTRUCTIONS
You came to boost your immune system such as warm tea, warm soups, Airborne, etc.  Warm shower can also help facilitate air drainage.  Take Mucinex fast max all-in-one cold and flu multisymptom relief.  This is without any special initials on there such as DM, D, or S.   It is usually a white box, or white bottle with a [place] wheel on it that says all-in-one.   3 prescription sent to the pharmacy.  Azelastine nasal spray. 1 spray each nostril every 12 hours for the next 5 to 7 days, then use as needed.  Prednisone 20 mg tabs. TAKE 40mg (2 tablets) today, tomorrow, and Sunday.  Caution with this medication as it can decrease your immune system.  Increase your immunity as above.  Prednisone will help out with inflammation of the ear, and nasal inflammation especially the right side of the nose.  Augmentin is treatment of choice when it comes to sinus infections.  1 tablet every 12 hours for the next 10 days.  Do not stop when you are feeling better.  Finish the full course.

## 2025-03-10 ENCOUNTER — HOSPITAL ENCOUNTER (OUTPATIENT)
Age: 35
Discharge: HOME | End: 2025-03-10
Payer: COMMERCIAL

## 2025-03-10 VITALS
RESPIRATION RATE: 18 BRPM | HEART RATE: 78 BPM | TEMPERATURE: 98 F | SYSTOLIC BLOOD PRESSURE: 129 MMHG | DIASTOLIC BLOOD PRESSURE: 88 MMHG | OXYGEN SATURATION: 100 %

## 2025-03-10 DIAGNOSIS — H60.513 ACUTE ACTINIC OTITIS EXTERNA, BILATERAL: ICD-10-CM

## 2025-03-10 DIAGNOSIS — J04.0 ACUTE LARYNGITIS: ICD-10-CM

## 2025-03-10 DIAGNOSIS — J06.9 VIRAL UPPER RESPIRATORY TRACT INFECTION: Primary | ICD-10-CM

## 2025-03-10 LAB
POCT INFLUENZA A: NEGATIVE
POCT INFLUENZA B: NEGATIVE
S PYO AG THROAT QL IA.RAPID: NEGATIVE
SARS-COV-2 RNA RESP QL NAA+PROBE: NOT DETECTED

## 2025-03-10 PROCEDURE — 87651 STREP A DNA AMP PROBE: CPT | Performed by: NURSE PRACTITIONER

## 2025-03-10 PROCEDURE — 99213 OFFICE O/P EST LOW 20 MIN: CPT

## 2025-03-10 PROCEDURE — 99214 OFFICE O/P EST MOD 30 MIN: CPT

## 2025-03-10 PROCEDURE — 87502 INFLUENZA DNA AMP PROBE: CPT | Performed by: NURSE PRACTITIONER

## 2025-03-10 RX ORDER — BENZONATATE 200 MG/1
200 CAPSULE ORAL 3 TIMES DAILY PRN
Qty: 21 CAPSULE | Refills: 0 | Status: SHIPPED | OUTPATIENT
Start: 2025-03-10

## 2025-03-10 RX ORDER — CIPROFLOXACIN AND DEXAMETHASONE 3; 1 MG/ML; MG/ML
4 SUSPENSION/ DROPS AURICULAR (OTIC) 2 TIMES DAILY
Qty: 7.5 ML | Refills: 0 | Status: SHIPPED | OUTPATIENT
Start: 2025-03-10 | End: 2025-03-17

## 2025-03-10 NOTE — ED PROVIDER NOTES
Patient Seen in: [facility]      History     Chief Complaint   Patient presents with    Cough/URI     Stated Complaint: URI/laryngitis    Subjective:   HPI      This is a 34-year-old male presenting with nasal congestion cough headache and loss of voice.  Patient states Saturday he started to get throat irritation started to lose his voice then next day developed a headache cough and congestion and runny nose.  Patient states he does have a history of ear infections due to surgery a long time ago and sinus infections.  No fever chest pain shortness of breath vomiting or diarrhea.    Objective:     History reviewed. No pertinent past medical history.           History reviewed. No pertinent surgical history.             No pertinent social history.            Review of Systems    Positive for stated complaint: URI/laryngitis  Other systems are as noted in HPI.  Constitutional and vital signs reviewed.      All other systems reviewed and negative except as noted above.    Physical Exam     ED Triage Vitals [03/10/25 0928]   /88   Pulse 78   Resp 18   Temp 98.2 °F (36.8 °C)   Temp src Oral   SpO2 100 %   O2 Device None (Room air)       Current Vitals:   Vital Signs  BP: 129/88  Pulse: 78  Resp: 18  Temp: 98.2 °F (36.8 °C)  Temp src: Oral    Oxygen Therapy  SpO2: 100 %  O2 Device: None (Room air)        Physical Exam  Vitals and nursing note reviewed.   Constitutional:       Appearance: Normal appearance.   HENT:      Ears:      Comments: TMs are nonerythematous nonbulging canals are moist but not erythematous there is small amount of drainage present no obvious ruptured TMs there is no pain with pulling of the tragus and no mastoid TTP     Nose: Congestion and rhinorrhea present.      Mouth/Throat:      Mouth: Mucous membranes are moist.      Pharynx: Oropharynx is clear. No posterior oropharyngeal erythema.   Eyes:      Conjunctiva/sclera: Conjunctivae normal.   Cardiovascular:      Rate and  Rhythm: Normal rate.   Pulmonary:      Effort: Pulmonary effort is normal. No respiratory distress.      Breath sounds: Normal breath sounds. No wheezing.      Comments: + cough  Musculoskeletal:         General: Normal range of motion.      Cervical back: Normal range of motion. No rigidity or tenderness.   Lymphadenopathy:      Cervical: No cervical adenopathy.   Skin:     General: Skin is warm.      Capillary Refill: Capillary refill takes less than 2 seconds.   Neurological:      General: No focal deficit present.      Mental Status: He is alert and oriented to person, place, and time.             ED Course     Labs Reviewed   RAPID STREP A - Normal   POCT FLU TEST - Normal    Narrative:     This assay is a rapid molecular in vitro test utilizing nucleic acid amplification of influenza A and B viral RNA.   RAPID SARS-COV-2 BY PCR - Normal            MDM           Medical Decision Making  34-year-old male nontoxic-appearing with viral symptoms since Saturday DDx influenza versus COVID versus another respiratory or viral illness versus viral or bacterial pharyngitis versus laryngitis versus OM versus OE versus otalgia.  Discussed with the patient likely a viral respiratory illness and offered COVID flu and strep discussed that he does have some fluid in both of the ears canals recommended over-the-counter Flonase nasal spray and Zyrtec anticipate prescribing benzonatate for the cough and likely Ciprodex drops for the ears.  Patient is agreeable with this plan of care.    COVID flu strep negative discussed results with patient and family at bedside discussed likely a respiratory virus and concern for otitis externa discussed treatment with Ciprodex for otitis externa benzonatate for the cough over-the-counter Flonase and Zyrtec and ibuprofen Tylenol discussed outpatient follow-up and ER precautions.  Patient acknowledges understanding discharge instructions.    Problems Addressed:  Acute actinic otitis externa,  bilateral: acute illness or injury  Acute laryngitis: acute illness or injury  Viral upper respiratory tract infection: acute illness or injury    Amount and/or Complexity of Data Reviewed  Labs: ordered. Decision-making details documented in ED Course.    Risk  OTC drugs.  Prescription drug management.        Disposition and Plan     Clinical Impression:  1. Viral upper respiratory tract infection    2. Acute actinic otitis externa, bilateral    3. Acute laryngitis         Disposition:  Discharge  3/10/2025 10:10 am    Follow-up:    Follow-up with your primary care provider within a week              Medications Prescribed:  Current Discharge Medication List        START taking these medications    Details   benzonatate 200 MG Oral Cap Take 1 capsule (200 mg total) by mouth 3 (three) times daily as needed for cough.  Qty: 21 capsule, Refills: 0    Associated Diagnoses: Viral upper respiratory tract infection; Acute actinic otitis externa, bilateral; Acute laryngitis      ciprofloxacin-dexamethasone 0.3-0.1 % Otic Suspension Place 4 drops into both ears 2 (two) times daily for 7 days.  Qty: 7.5 mL, Refills: 0    Associated Diagnoses: Viral upper respiratory tract infection; Acute actinic otitis externa, bilateral; Acute laryngitis                 Supplementary Documentation:

## 2025-03-10 NOTE — DISCHARGE INSTRUCTIONS
Follow-up with your primary care provider within a week start the cough medication as prescribed start over-the-counter Flonase and Zyrtec to help with runny nose congestion or postnasal drip start the antibiotic eardrops as prescribed take over-the-counter Tylenol or ibuprofen for pain.  If you develop fever worsening cough vomiting diarrhea chest pain shortness of breath go to the nearest emergency department.

## 2025-05-05 ENCOUNTER — HOSPITAL ENCOUNTER (OUTPATIENT)
Age: 35
Discharge: HOME | End: 2025-05-05
Attending: STUDENT IN AN ORGANIZED HEALTH CARE EDUCATION/TRAINING PROGRAM
Payer: COMMERCIAL

## 2025-05-05 VITALS
TEMPERATURE: 98 F | DIASTOLIC BLOOD PRESSURE: 64 MMHG | OXYGEN SATURATION: 100 % | SYSTOLIC BLOOD PRESSURE: 117 MMHG | RESPIRATION RATE: 18 BRPM | HEART RATE: 68 BPM

## 2025-05-05 DIAGNOSIS — H65.92 LEFT NON-SUPPURATIVE OTITIS MEDIA: ICD-10-CM

## 2025-05-05 DIAGNOSIS — H61.012 ACUTE PERICHONDRITIS OF LEFT EXTERNAL EAR: Primary | ICD-10-CM

## 2025-05-05 DIAGNOSIS — J00 ACUTE RHINITIS: ICD-10-CM

## 2025-05-05 DIAGNOSIS — M54.32 SCIATICA OF LEFT SIDE: ICD-10-CM

## 2025-05-05 PROCEDURE — 99214 OFFICE O/P EST MOD 30 MIN: CPT

## 2025-05-05 PROCEDURE — 99213 OFFICE O/P EST LOW 20 MIN: CPT

## 2025-05-05 RX ORDER — CIPROFLOXACIN 500 MG/1
500 TABLET, FILM COATED ORAL 2 TIMES DAILY
Qty: 14 TABLET | Refills: 0 | Status: SHIPPED | OUTPATIENT
Start: 2025-05-05 | End: 2025-05-12

## 2025-05-05 RX ORDER — CEFDINIR 300 MG/1
300 CAPSULE ORAL 2 TIMES DAILY
Qty: 14 CAPSULE | Refills: 0 | Status: SHIPPED | OUTPATIENT
Start: 2025-05-05 | End: 2025-05-12

## 2025-05-05 NOTE — ED INITIAL ASSESSMENT (HPI)
Patient arrived ambulatory to room c/o left ear pain. No drainage. Patient states he continuously feels a \"popping\" sensation. No fevers. Patient also c/o left lower back pain radiating to the left leg. Pain worsens when going from sitting to standing. No urinary/bowel complaints. Ambulating with steady gait.

## 2025-05-05 NOTE — ED PROVIDER NOTES
Patient Seen in: [facility]      History     Chief Complaint   Patient presents with    Ear Pain     Stated Complaint: Left Ear Problem, Back Pain    Subjective:   HPI    35-year-old male with no significant past medical history, who presents with his wife and with his sone with concern for symptomatic left-sided ear pain and popping within the ear, no trauma, no drainage, today he has noticed some pruritus and erythema and swelling of the left external ear.  Per chart review, and confirmed by the patient, on 3/10/2025, he was treated with Ciprodex for B/L otitis externa, he notes ongoing nasal congestion, no relief with intranasal Flonase, also notes for 2 months he has had left back pain pointing over the left lumbar paraspinals and into the left gluteal region with radiation down the thigh, no numbness, no weakness, no tingling, no bowel or bladder incontinence or retention.  Only reported antibiotic allergy is to sulfas.    Objective:     History reviewed. No pertinent past medical history.           History reviewed. No pertinent surgical history.             Social History     Socioeconomic History    Marital status: Single   Tobacco Use    Smoking status: Never    Smokeless tobacco: Never   Vaping Use    Vaping status: Never Used   Substance and Sexual Activity    Alcohol use: Yes     Comment: occasionally    Drug use: Never              Review of Systems    Positive for stated complaint: Left Ear Problem, Back Pain  Other systems are as noted in HPI.  Constitutional and vital signs reviewed.      All other systems reviewed and negative except as noted above.                  Physical Exam     ED Triage Vitals [05/05/25 1536]   /64   Pulse 68   Resp 18   Temp 97.7 °F (36.5 °C)   Temp src Oral   SpO2 100 %   O2 Device None (Room air)       Current Vitals:   Vital Signs  BP: 117/64  Pulse: 68  Resp: 18  Temp: 97.7 °F (36.5 °C)  Temp src: Oral    Oxygen Therapy  SpO2: 100 %  O2 Device: None  (Room air)        Physical Exam    General: Awake, alert, comfortable on room air, in no distress, tolerating oral secretions, interactive  Pulmonary: Lungs CTA B, no wheezing, no conversational dyspnea  GI: Abdomen soft, nontender, nondistended, no rebound, no guarding, no palpable masses, no hepatomegaly, no splenomegaly, negative Duncan's, negative McBurney's  Neuro: Symmetrical facial expressions on gross observation  Musculoskeletal: 5/5 B/L knee flexion and extension and dorsiflexion and plantarflexion, no TTP or step-offs of the midline thoracic or lumbar spine, TTP over the left low lumbar paraspinals and left gluteal region with no overlying skin changes, negative straight leg raise, but elevating the left leg does resulted in exacerbation of left low lumbar back pain  HEENT: No periorbital edema or erythema, mildly erythematous but  nonedematous intact RT TM, very erythematous and edematous intact left TM with middle ear effusion and purulence appreciated behind the left TM which is intact with no sign of perforation, no erythema or edema of the B/L ear canals and no erythema or edema of the B/L mastoid regions, there is mild erythema and edema of the left external ear but no tenderness or fluctuance, erythematous and edematous nonobstructive B/L nasal turbinates, nonerythematous and nonedematous B/L tonsils, no tonsillar exudates, no peritonsillar edema, uvula midline, tolerating oral secretions, normal speech, no submandibular edema  Psych: Normal mood, normal affect      ED Course   No labs or imaging performed    MDM   Patient's exam is consistent with left-sided otitis media with no otitis externa but exam consistent with early left-sided perichondritis of the external ear and no associated mastoiditis, no tonsillitis or PTA or deep space infection, rhinitis is present which is likely contributing to a eustachian tube dysfunction increasing the risk for secondary bacterial infection of the left  eardrum  -Patient's only reported antibiotic allergy is to sulfa antibiotics.  -Discussed concern with on-call ENT provider, Dr. Oconnor, regarding that patient's exam is consistent with both left-sided otitis media as well as left-sided perichondritis, no otitis externa, patient consented to consultation, picture of left external ear compared to right external ear also provided securely via [employer], Dr. Oconnor agrees with oral antibiotics with cefdinir for left-sided otitis media and oral ciprofloxacin for left-sided perichondritis. No otic antibiotic drops indicated at this time give no signs of otitis externa at this time.  -We discussed potential side effects of ciprofloxacin, but also indication as this is the only oral antibiotic that has pseudomonal coverage which is most common bacteria associated with perichondritis, instructed no sports, no physical activity, no heavy lifting or straining, until completing antibiotics given risks of fluoroquinolones, patient is agreeable with this plan  -We discussed that even in the setting of antibiotics, infections can still spread, progress, and develop complications, and therefore discussed strict ED precautions with any new, changing, or progressing signs or symptoms  -We also discussed over-the-counter intranasal Flonase for rhinitis      Patient has TTP over the left lumbar paraspinals and left gluteal region and story is consistent with likely sciatica, no TTP or step-offs of the midline lumbar spine and no trauma to suggest fracture with no indication for x-ray imaging at this time, no symptoms of cauda equina syndrome, abdomen is soft and nontender with no sign of acute abdomen, no overlying skin changes with no sign of cellulitis or shingles  -We discussed symptomatic management with rest, no heavy lifting, no straining, over-the-counter Tylenol or ibuprofen if needed for pain control, and safe application of heat packs, patient instructed to follow-up  with a primary care physician for reassessment and for further recommendations, information for primary care provider with available appointments this week provided in discharge instructions as patient reports he does not have a primary care physician  -We initially discussed prescription for steroid/Medrol Dosepak for sciatica, but given significance of potential interactions with fluoroquinolones and the higher need to treat for perichondritis with fluoroquinolone, steroids were avoided and were not prescribed, patient understands and agrees with this plan  -Strict ED precautions were discussed      Medical Decision Making  Amount and/or Complexity of Data Reviewed  Independent Historian: spouse     Details: Pt's wife assists with history  Discussion of management or test interpretation with external provider(s): Discussed with ENT specialist Dr. Oconnor    Risk  OTC drugs.  Prescription drug management.        Disposition and Plan     Clinical Impression:  1. Acute perichondritis of left external ear    2. Left non-suppurative otitis media    3. Sciatica of left side    4. Acute rhinitis         Disposition:  Discharge  5/5/2025  4:20 pm    Follow-up:  Alexys Oconnor DO  [address]  [address]  [place] [place] [address]  [phone]    In 2 days  ENT    Emeterio Harrison MD  [address]  [facility] [address]  [phone]      ENT    Julio Thompson MD  [address]  [facility] [address] [phone]      primary care          Medications Prescribed:  Discharge Medication List as of 5/5/2025  4:20 PM          cefdinir 300 MG Oral Cap 14 capsule 0 5/5/2025 5/12/2025   Sig:   Take 1 capsule (300 mg total) by mouth 2 (two) times daily for 7 days.     Route:   Oral         ciprofloxacin 500 MG Oral Tab 14 tablet 0 5/5/2025 5/12/2025   Sig:   Take 1 tablet (500 mg total) by mouth 2 (two) times daily for 7 days.     Route:   Oral

## 2025-05-05 NOTE — DISCHARGE INSTRUCTIONS
Your exam at this time is concerning for an infection of both the eardrum and the external ear for which I have prescribed antibiotics.  It is extremely important that you are followed up and assessed by an ears nose and throat specialist within 72 hours.  If you notice any signs of progression of infection such as spreading redness, fevers, progression of swelling, redness or swelling behind the ear, you should be assessed immediately in emergency department, as even in the setting of antibiotics, infections can still spread, progress, and develop complications.    Your exam is consistent with otitis media which is a bacterial infection of the ear drum. I have prescribed antibiotics to help treat this infection. Symptoms should begin to improve within 72 hours on antibiotics, if there is no improvement or if you develop any new, changing, progressing, or worsening signs or symptoms, you should present immediately to your primary care physician for reassessment. If you develop any redness, pain, or swelling behind the ears, you should present immediately to the emergency department for assessment.       Your exam appears to be consistent with inflammation of the sciatic nerve, at this time, given the type of antibiotics required to treat your infection, we are avoiding steroids, but I do recommend follow-up with your primary care physician in 48 hours for reassessment and for further recommendations.    With any bowel or bladder incontinence or retention or weakness or numbness in the legs or severe pain I recommend immediate assessment in the emergency department.

## 2025-05-09 ENCOUNTER — OFFICE VISIT (OUTPATIENT)
Dept: OTOLARYNGOLOGY | Facility: CLINIC | Age: 35
End: 2025-05-09

## 2025-05-09 DIAGNOSIS — H92.12 OTORRHEA, LEFT EAR: ICD-10-CM

## 2025-05-09 DIAGNOSIS — H72.92 PERFORATION OF LEFT TYMPANIC MEMBRANE: ICD-10-CM

## 2025-05-09 DIAGNOSIS — H66.93 BILATERAL OTITIS MEDIA, UNSPECIFIED OTITIS MEDIA TYPE: ICD-10-CM

## 2025-05-09 DIAGNOSIS — H61.22 EXCESSIVE CERUMEN IN EAR CANAL, LEFT: ICD-10-CM

## 2025-05-09 DIAGNOSIS — H69.90 EUSTACHIAN TUBE DISORDER, UNSPECIFIED LATERALITY: Primary | ICD-10-CM

## 2025-05-09 PROCEDURE — 69210 REMOVE IMPACTED EAR WAX UNI: CPT | Performed by: STUDENT IN AN ORGANIZED HEALTH CARE EDUCATION/TRAINING PROGRAM

## 2025-05-09 PROCEDURE — 99203 OFFICE O/P NEW LOW 30 MIN: CPT | Performed by: STUDENT IN AN ORGANIZED HEALTH CARE EDUCATION/TRAINING PROGRAM

## 2025-05-09 RX ORDER — TOBRAMYCIN AND DEXAMETHASONE 3; 1 MG/ML; MG/ML
SUSPENSION/ DROPS OPHTHALMIC
Qty: 10 ML | Refills: 0 | Status: SHIPPED | OUTPATIENT
Start: 2025-05-09

## 2025-05-09 RX ORDER — METHYLPREDNISOLONE 4 MG/1
TABLET ORAL
Qty: 21 TABLET | Refills: 0 | Status: SHIPPED | OUTPATIENT
Start: 2025-05-09

## 2025-05-09 NOTE — PROGRESS NOTES
Jamarcus Mitchell is a 35 year old male.   Chief Complaint   Patient presents with    Ear Problem     Left ear popping X 2 months      HPI:   35-year-old with a history of chronic eustachian tube dysfunction as well as history of cleft palate when he was a child.  Reports 2 months of possible left-sided ear infection with difficulty hearing and ear drainage.    Current Medications[1]   Past Medical History[2]   Social History:  Short Social Hx on File[3]   Past Surgical History[4]      EXAM:   There were no vitals taken for this visit.    System Details   Skin Inspection - Normal.   Constitutional Overall appearance - Normal.   Head/Face Symmetric, TMJ tenderness not present    Eyes EOMI, PERRL   Right ear:  Canal clear, TM retracted with likely effusion   Left ear:  Canal with cerumen and otorrhea, TM with a small perforation, otitis media   Nose: Septum midline, inferior turbinates not enlarged, nasal valves without collapse    Oral cavity/Oropharynx: No lesions or masses on inspection or palpation, tonsils symmetric    Neck: Soft without LAD, thyroid not enlarged  Voice clear/ no stridor   Other:      SCOPES AND PROCEDURES:     Canals:  Left: Canal with cerumen preventing adequate view of TM, debrided with instrumentation    Tympanic Membranes:  Left: Normal tympanic membrane.     TM Visualized Method:   Left TM examined via otomicroscopy.      PROCEDURE:   Removal of cerumen impaction   The cerumen impaction was completely removed on the left side using microscopy as necessary.   Removal was completed by using a curette and suction.     AUDIOGRAM AND IMAGING:         IMPRESSION:   1. Eustachian tube disorder, unspecified laterality    2. Bilateral otitis media, unspecified otitis media type    3. Perforation of left tympanic membrane    4. Otorrhea, left ear    5. Excessive cerumen in ear canal, left       Recommendations:  - Likely has chronic eustachian tube dysfunction has an otitis media on the left with  [FreeTextEntry1] : follow-up  rupture and appears to have retracted tympanic membranes on each side with an effusion on the right  - Left ear was debrided we will begin on topical otic drops as well as a course of oral Augmentin and a Medrol Dosepak  - Discussed the use of the medications would like to see him back in 2 weeks    -Short term use risks of oral steroids include fluid retention, causing swelling in your lower legs, high blood pressure, mood swings, memory, behavior, and other psychological effects, such as confusion or delirium, upset stomach, increased blood sugar, and other less likely but serious side effects such as avascular necrosis     The patient indicates understanding of these issues and agrees to the plan.      Emeterio Harrison MD  5/9/2025  3:49 PM       [1]   Current Outpatient Medications   Medication Sig Dispense Refill    methylPREDNISolone 4 MG Oral Tablet Therapy Pack Take by oral route. 21 tablet 0    tobramycin-dexamethasone 0.3-0.1 % Ophthalmic Suspension Apply 5 drops twice a day to left ear for 7 days. 10 mL 0    amoxicillin clavulanate 875-125 MG Oral Tab Take 1 tablet by mouth every 12 (twelve) hours for 10 days. 20 tablet 0    ciprofloxacin 500 MG Oral Tab Take 1 tablet (500 mg total) by mouth 2 (two) times daily for 7 days. 14 tablet 0    cefdinir 300 MG Oral Cap Take 1 capsule (300 mg total) by mouth 2 (two) times daily for 7 days. 14 capsule 0    benzonatate 200 MG Oral Cap Take 1 capsule (200 mg total) by mouth 3 (three) times daily as needed for cough. (Patient not taking: Reported on 5/9/2025) 21 capsule 0    azelastine 0.1 % Nasal Solution 1 spray each nostril every 12 hours for the next 5 to 7 days and then use as needed thereafter. (Patient not taking: Reported on 5/9/2025) 30 mL 0    Benzocaine-Menthol (CEPACOL) 15-2.3 MG Mouth/Throat Lozenge Use as directed 1 lozenge in the mouth or throat 3 (three) times daily as needed. (Patient not taking: Reported on 5/9/2025) 16 lozenge 0    Spacer/Aero-Holding  Chanel Does not apply Device Use with albuterol inhaler (Patient not taking: Reported on 5/9/2025) 1 each 0    ibuprofen 600 MG Oral Tab Take 1 tablet (600 mg total) by mouth every 6 hours with food (Patient not taking: Reported on 5/9/2025) 20 tablet 0   [2] History reviewed. No pertinent past medical history.  [3]   Social History  Socioeconomic History    Marital status: Single   Tobacco Use    Smoking status: Never     Passive exposure: Never    Smokeless tobacco: Never   Vaping Use    Vaping status: Never Used   Substance and Sexual Activity    Alcohol use: Yes     Comment: occasionally    Drug use: Never   [4]   Past Surgical History:  Procedure Laterality Date    Jaw surgery        [de-identified] : 65 yo F PMH DM2, HTN, Hypothyroidism, depression, PE, ABBIE (not on CPAP), bariatric surgery (gastric bypass), iron deficiency presenting today for RLQ pain. Her pain started 10 days ago. The pain is constant and worse when she goes over a bump in the road and with a bowel movement. She states the pain was "double over pain" she began to have some relief two days ago, but still has pain and is still worse with any bowel movement,     Denies any urinary symptoms, N/V/D/C, blood in stool, fevers.                                   She has taken Tylenol, Naprosyn, and on occasional oxycodone.    Of note, since our last visit, she was seen by Dr. Flores for acute onset knee pain, found to have full thickness meniscus tear - she has not followed up w/ ortho.  She also has not done an INR in the last month - reports a few weeks ago she had a URI and took a Zpack -so did not do INR yet.

## 2025-05-27 ENCOUNTER — OFFICE VISIT (OUTPATIENT)
Dept: OTOLARYNGOLOGY | Facility: CLINIC | Age: 35
End: 2025-05-27

## 2025-05-27 DIAGNOSIS — H66.93 BILATERAL OTITIS MEDIA, UNSPECIFIED OTITIS MEDIA TYPE: ICD-10-CM

## 2025-05-27 DIAGNOSIS — H69.90 EUSTACHIAN TUBE DISORDER, UNSPECIFIED LATERALITY: Primary | ICD-10-CM

## 2025-05-27 DIAGNOSIS — H92.12 OTORRHEA, LEFT EAR: ICD-10-CM

## 2025-05-27 PROCEDURE — 99213 OFFICE O/P EST LOW 20 MIN: CPT | Performed by: STUDENT IN AN ORGANIZED HEALTH CARE EDUCATION/TRAINING PROGRAM

## 2025-05-27 NOTE — PROGRESS NOTES
Jamarcus Mitchell is a 35 year old male.   Chief Complaint   Patient presents with    Follow - Up     Patient is here for 2 weeks follow up of ear infection reports improving      HPI:   35-year-old in follow-up regarding his eustachian tube dysfunction.  Reports he is hearing better    Current Medications[1]   Past Medical History[2]   Social History:  Short Social Hx on File[3]   Past Surgical History[4]      EXAM:   There were no vitals taken for this visit.    System Details   Skin Inspection - Normal.   Constitutional Overall appearance - Normal.   Head/Face Symmetric, TMJ tenderness not present    Eyes EOMI, PERRL   Right ear:  Canal clear, TM retracted, possibly small air bubbles   Left ear:  Canal clear, TM slightly friable and inflamed, no CHLOÉ   Nose: Septum midline, inferior turbinates not enlarged, nasal valves without collapse    Oral cavity/Oropharynx: No lesions or masses on inspection or palpation, tonsils symmetric    Neck: Soft without LAD, thyroid not enlarged  Voice clear/ no stridor   Other:      SCOPES AND PROCEDURES:         AUDIOGRAM AND IMAGING:         IMPRESSION:   1. Eustachian tube disorder, unspecified laterality    2. Otorrhea, left ear    3. Bilateral otitis media, unspecified otitis media type       Recommendations:  - Overall improving.  Chronic eustachian tube dysfunction  - Still some friability of the left tympanic membrane should continue the topical otic drops for 1 more week  - Hearing is improving appears the effusion on the right is resolving  - Can follow back up as needed    The patient indicates understanding of these issues and agrees to the plan.  Longitudinal care will be provided    Emeterio Harrison MD  5/27/2025  3:35 PM       [1]   Current Outpatient Medications   Medication Sig Dispense Refill    methylPREDNISolone 4 MG Oral Tablet Therapy Pack Take by oral route. 21 tablet 0    Spacer/Aero-Holding Chambers Does not apply Device Use with albuterol inhaler 1 each 0     tobramycin-dexamethasone 0.3-0.1 % Ophthalmic Suspension Apply 5 drops twice a day to left ear for 7 days. (Patient not taking: Reported on 5/27/2025) 10 mL 0    benzonatate 200 MG Oral Cap Take 1 capsule (200 mg total) by mouth 3 (three) times daily as needed for cough. (Patient not taking: Reported on 5/27/2025) 21 capsule 0    azelastine 0.1 % Nasal Solution 1 spray each nostril every 12 hours for the next 5 to 7 days and then use as needed thereafter. (Patient not taking: Reported on 5/27/2025) 30 mL 0    Benzocaine-Menthol (CEPACOL) 15-2.3 MG Mouth/Throat Lozenge Use as directed 1 lozenge in the mouth or throat 3 (three) times daily as needed. (Patient not taking: Reported on 5/27/2025) 16 lozenge 0    ibuprofen 600 MG Oral Tab Take 1 tablet (600 mg total) by mouth every 6 hours with food (Patient not taking: Reported on 5/27/2025) 20 tablet 0   [2] History reviewed. No pertinent past medical history.  [3]   Social History  Socioeconomic History    Marital status: Single   Tobacco Use    Smoking status: Never     Passive exposure: Never    Smokeless tobacco: Never   Vaping Use    Vaping status: Never Used   Substance and Sexual Activity    Alcohol use: Yes     Comment: occasionally    Drug use: Never   [4]   Past Surgical History:  Procedure Laterality Date    Jaw surgery

## 2025-08-05 ENCOUNTER — OFFICE VISIT (OUTPATIENT)
Dept: OTOLARYNGOLOGY | Facility: CLINIC | Age: 35
End: 2025-08-05

## 2025-08-05 DIAGNOSIS — H65.93 FLUID LEVEL BEHIND TYMPANIC MEMBRANE OF BOTH EARS: ICD-10-CM

## 2025-08-05 DIAGNOSIS — H69.90 EUSTACHIAN TUBE DISORDER, UNSPECIFIED LATERALITY: Primary | ICD-10-CM

## 2025-08-05 DIAGNOSIS — H66.92 LEFT OTITIS MEDIA, UNSPECIFIED OTITIS MEDIA TYPE: ICD-10-CM

## 2025-08-05 PROCEDURE — 99214 OFFICE O/P EST MOD 30 MIN: CPT | Performed by: STUDENT IN AN ORGANIZED HEALTH CARE EDUCATION/TRAINING PROGRAM

## 2025-08-05 RX ORDER — AZITHROMYCIN 250 MG/1
TABLET, FILM COATED ORAL
Qty: 6 TABLET | Refills: 0 | Status: SHIPPED | OUTPATIENT
Start: 2025-08-05

## 2025-08-05 RX ORDER — METHYLPREDNISOLONE 4 MG/1
TABLET ORAL
Qty: 21 TABLET | Refills: 0 | Status: SHIPPED | OUTPATIENT
Start: 2025-08-05

## (undated) NOTE — ED AVS SNAPSHOT
Haroldo Pulido   MRN: [id number]    Department:  [facility] Emergency Department   Date of Visit:  7/8/2018           Disclosure     Insurance plans vary and the physician(s) referred by the ER may not be covered by your plan.  Please contact you CARE PHYSICIAN AT ONCE OR RETURN IMMEDIATELY TO THE EMERGENCY DEPARTMENT. If you have been prescribed any medication(s), please fill your prescription right away and begin taking the medication(s) as directed.   If you believe that any of the medications

## (undated) NOTE — LETTER
Date & Time: 9/10/2024, 4:48 PM  Patient: Jamarcus Mitchell  Encounter Provider(s):    Eric Baldwin PA       To Whom It May Concern:    Jamarcus Mitchell was seen and treated in our department on 9/10/2024. He should not return to work until Wednesday  .    If you have any questions or concerns, please do not hesitate to call.      ERIC BALDWIN   _____________________________  Physician/APC Signature

## (undated) NOTE — ED AVS SNAPSHOT
Elaina Campo   MRN: [id number]    Department:  [facility] Emergency Department   Date of Visit:  5/21/2018           Disclosure     Insurance plans vary and the physician(s) referred by the ER may not be covered by your plan.  Please contact yo CARE PHYSICIAN AT ONCE OR RETURN IMMEDIATELY TO THE EMERGENCY DEPARTMENT. If you have been prescribed any medication(s), please fill your prescription right away and begin taking the medication(s) as directed.   If you believe that any of the medications

## (undated) NOTE — LETTER
Date & Time: 3/10/2025, 10:12 AM  Patient: Jamarcus Mitchell  Encounter Provider(s):    Betty Chavez APRN       To Whom It May Concern:    Jamarcus Mitchell was seen and treated in our department on 3/10/2025. Please excuse Jamarcus from work until 3/12/2025.    If you have any questions or concerns, please do not hesitate to call.      SHANDA Espino    Physician/APC Signature

## (undated) NOTE — LETTER
Date & Time: 9/22/2020, 1:04 PM  Patient: Ibis Swenson  Encounter Provider(s):    SHANDA Casiano       To Whom It May Concern:    Khalida Black was seen and treated in our department on 9/22/2020.  He should not return to work until Symptoms

## (undated) NOTE — LETTER
Date & Time: 11/22/2024, 10:16 AM  Patient: Jamarcus Mitchell  Encounter Provider(s):    Cindy Holloway APRN       To Whom It May Concern:    Jamarcus Mitchell was seen and treated in our department on 11/22/2024.  Please excuse from work today 11/22/2024.    SHANDA Church, 11/22/24, 10:16 AM

## (undated) NOTE — LETTER
Date & Time: 11/5/2021, 11:12 AM  Patient: Karena Oneill  Encounter Provider(s):    Salena Warner MD       To Whom It May Concern:    Shubham Grandchild was seen and treated in our department on 11/5/2021. He may return to work on 11/5/21.     If you hav